# Patient Record
Sex: FEMALE | Race: WHITE
[De-identification: names, ages, dates, MRNs, and addresses within clinical notes are randomized per-mention and may not be internally consistent; named-entity substitution may affect disease eponyms.]

---

## 2018-06-19 ENCOUNTER — HOSPITAL ENCOUNTER (OUTPATIENT)
Dept: HOSPITAL 89 - MAMO | Age: 63
End: 2018-06-19
Attending: FAMILY MEDICINE
Payer: COMMERCIAL

## 2018-06-19 DIAGNOSIS — Z12.31: Primary | ICD-10-CM

## 2018-06-19 PROCEDURE — 77063 BREAST TOMOSYNTHESIS BI: CPT

## 2018-06-19 PROCEDURE — 77067 SCR MAMMO BI INCL CAD: CPT

## 2018-06-19 NOTE — RADIOLOGY IMAGING REPORT
FACILITY: [facility] - [place] 

 

PATIENT NAME: TAMERA JACOBSEN

: [id number]

MR: [id number]

V: [id number]

EXAM DATE: [id number]

ORDERING PHYSICIAN: ROBERT ANGLIN

TECHNOLOGIST: Shanice Ba

 

PROCEDURE:BILATERAL DIGITAL SCREENING MAMMOGRAM WITH CAD ASSISTED 

INTERPRETATION & 3D TOMOSYNTHESIS 

 

COMPARISON:Prior mammograms 6/15/17, 16, 2/23/15, 14, 

13, 12.

 

INDICATIONS:screening

 

FINDINGS:  

There is predominant fatty replacement throughout the breasts. The 

parenchymal pattern has remained stable allowing for difference in 

mammographic technique & patient positioning. There is no evidence of 

malignant appearing mass, malignant appearing calcifications or other 

secondary sign of malignancy in either breast.

 

DIAGNOSTIC CATEGORY 1--NEGATIVE.  

 

RECOMMENDATIONS:

ROUTINE MAMMOGRAM AND CLINICAL EVALUATION.   

 

IMPRESSION: 

BIRADS 1: Negative.

No significant abnormality is seen.

 

 

 

 

 

 

 

 

 

Dictated by:  Uzma Dowell M.D. on 2018 at 15:03   

Transcribed by: FIX on 2018 at 15:07    

Approved by:  Uzma Dowell M.D. on 2018 at 15:07   

[facility]

## 2018-10-30 ENCOUNTER — HOSPITAL ENCOUNTER (OUTPATIENT)
Dept: HOSPITAL 89 - US | Age: 63
End: 2018-10-30
Attending: PHYSICAL MEDICINE & REHABILITATION
Payer: MEDICARE

## 2018-10-30 DIAGNOSIS — M79.662: Primary | ICD-10-CM

## 2018-10-30 NOTE — RADIOLOGY IMAGING REPORT
FACILITY: [facility] 

 

PATIENT NAME: Tanika Mccartney

: 1955

MR: [id number]

V: [id number]

EXAM DATE: 

ORDERING PHYSICIAN: BENJI OLMSTEAD

TECHNOLOGIST: 

 

[place] [facility] [place]: Tanika Mccartney

: 1955

MRN: [id number]

Visit/Account:[id number]

Date of Sevice: 10/30/2018

 

ACCESSION #: [id number]

 

Exam type: VENOUS DOPP LOW LEFT EXTREMITY

 

History: Left calf pain

 

Comparison: None.

 

Findings:

 

The flexion remains were imaged including the left common femoral vein, greater saphenous vein, popli
teal vein, posterior tibial vein, peroneal vein and anterior tibial veins revealing no evidence of in
traluminal thrombi the veins were compressible and demonstrated augmentation

 

IMPRESSION:

 

1.  No sonographic evidence DVT involving the left lower extremity veins

 

Report Dictated By: Uzma Dowell MD at 10/30/2018 3:34 PM

 

Report E-Signed By: Uzma Dowell MD  at 10/30/2018 3:35 PM

 

WSN:[id number]